# Patient Record
Sex: MALE | Race: WHITE
[De-identification: names, ages, dates, MRNs, and addresses within clinical notes are randomized per-mention and may not be internally consistent; named-entity substitution may affect disease eponyms.]

---

## 2019-01-01 ENCOUNTER — HOSPITAL ENCOUNTER (EMERGENCY)
Dept: HOSPITAL 41 - JD.ED | Age: 54
Discharge: HOME | End: 2019-01-01
Payer: COMMERCIAL

## 2019-01-01 DIAGNOSIS — Z88.8: ICD-10-CM

## 2019-01-01 DIAGNOSIS — Z88.5: ICD-10-CM

## 2019-01-01 DIAGNOSIS — J18.9: Primary | ICD-10-CM

## 2019-01-01 PROCEDURE — 87804 INFLUENZA ASSAY W/OPTIC: CPT

## 2019-01-01 PROCEDURE — 99283 EMERGENCY DEPT VISIT LOW MDM: CPT

## 2019-01-01 NOTE — ER
REASON FOR CLINIC VISIT:

Cough, fever, and myalgias.

 

HISTORY OF PRESENT ILLNESS:

This 53-year-old man began to feel generalized malaise approximately 5 days ago.

He describes it as "feeling achy."  He further went on to say that last night it

hit him "big time" with headache, coughing which has been productive of

increasing amounts of green, thick sputum.  He has had post-tussive emesis x2,

but he has also had nausea, particularly bothersome during coughing paroxysms.

He denies any diarrhea.  His biggest complaint has been of myalgias.  He has had

a fever of 100 to 101 at home.  He has been taking NyQuil.  He has no history of

smoking.  He has never had pneumonia in several years.

 

PAST MEDICAL HISTORY:

Unremarkable.

 

CURRENT MEDICATIONS:

None.

 

ALLERGIES:

To codeine and niacin.

 

REVIEW OF SYSTEMS:

Pertinent positives and negatives as in the HPI.

 

PHYSICAL EXAMINATION:

VITAL SIGNS:  He is afebrile, pulse of 83, blood pressure 122/84, respiratory

rate 16, O2 sats 93% and later 95%.

HEENT:  Head is normocephalic.  No conjunctivitis is noted.  TMs are normal.

Oropharynx is normal.

NECK:  Supple.  No adenopathy is noted.

CHEST:  He has good air exchange bilaterally.  He did have a few rhonchi at his

left base.  No rales or wheezes were noted.

CARDIAC:  Regular rate without murmur.

ABDOMEN:  Soft and nontender.  No hepatosplenomegaly.

EXTREMITIES:  Normal pulses.  No edema.

SKIN:  No rashes.

 

LABORATORY DATA:

An influenza swab was obtained for influenza A and B and this was negative.

 

FINAL DIAGNOSIS:

Community-acquired pneumonia.

 

PLAN:

Z-Magdiel, Zofran ODT 4 mg x1.  I also recommended hot steamy showers and plenty of

clear liquids.  Tylenol and ibuprofen as needed.  He should stay away from work

for the next day or two.  I also recommended Delsym over-the-counter for

symptomatic coughing relief.  He knows that should his symptoms worsen at any

time, he should return or should they persist beyond after his course of

antibiotics is completed, he should probably be rechecked as well.  All

questions were answered.  He understands and agrees.

 

DD:  01/01/2019 20:10:05

DT:  01/01/2019 22:15:51  MMODAL

Job #:  [id number]

## 2020-08-23 NOTE — EDM.PDOC
ED HPI GENERAL MEDICAL PROBLEM





- General


Chief Complaint: Chest Pain


Stated Complaint: CHEST PAIN


Time Seen by Provider: 08/23/20 11:16


Source of Information: Reports: Patient


History Limitations: Reports: No Limitations





- History of Present Illness


INITIAL COMMENTS - FREE TEXT/NARRATIVE: 


55-year-old male presents to the ED for evaluation of central chest pain that 

started about 0800 hrs. this morning.  He states he was already up for the day 

when the chest pressure discomfort came on and it lasted about an hour.  He sat 

around and it seemed to gradually dissipate.  He has a strong history of 

coronary disease having had 2 stents placed in [place] approximately 10 years 

ago and 3 stents placed in [place] about 5 years ago for total of 5 stents.  He is

on a low-cholesterol diet.  He is no longer on a blood thinner.  At present he 

is pain-free.  Denies cough sputum production shortness of breath orthopnea or 

PND.  Denies any burping or belching to relieve the discomfort.  Rarely gets 

heartburn.  No odynophagia while eating his breakfast after the pain started.





Onset: Today, Sudden


Onset Date: 08/23/20


Onset Time: 08:00


Duration: Hour(s):, Other (Pain-free at the time he was seen in the ED.)


Location: Reports: Chest (Trolled chest discomfort with no radiation through to 

his back up into his neck or arms.).  Denies: Head, Face


Quality: Reports: Pressure


Severity: Mild (Mild pressure discomfort rated at the most a 3 out of 10.)


Improves with: Reports: Other (It went away on its own.)


Worsens with: Reports: None, Other


Context: Reports: Other.  Denies: Activity, Exercise (Walking and moving did not

seem to make the pain worse.), Lifting, Sick Contact, Trauma


Associated Symptoms: Reports: Chest Pain (Spontaneous occurrence central chest 

pressure discomfort)


Treatments PTA: Reports: Other (see below) (His regular meds which she cannot 

remember all of.)


  ** Left Upper Chest


Pain Score (Numeric/FACES): 0





- Related Data


                                    Allergies











Allergy/AdvReac Type Severity Reaction Status Date / Time


 


codeine Allergy  Insomnia Verified 08/23/20 11:20


 


niacin Allergy  Hyperactivi Verified 08/23/20 11:20





   ty  











Home Meds: 


                                    Home Meds





Metoprolol Succinate 50 mg PO 08/23/20 [History]


Rosuvastatin Calcium [Crestor] 40 mg PO DAILY 08/23/20 [History]


lisinopriL [Lisinopril]  08/23/20 [History]











Past Medical History





- Past Health History


Medical/Surgical History: Denies Medical/Surgical History


Cardiovascular History: Reports: Hypertension, Stents (He had 2 stents placed 

approximately 10 years ago in [place] and 3 more stents placed in [place] 

approximately 5 years ago at [facility].).  Denies: MI


Endocrine/Metabolic History: Reports: Obesity/BMI 30+





Social & Family History





- Family History


Family Medical History: Noncontributory





- Living Situation & Occupation


Living situation: Reports: with Significant Other


Occupation: Employed





ED ROS GENERAL





- Review of Systems


Review Of Systems: See Below


Constitutional: Denies: Fever, Chills, Malaise, Weakness, Fatigue, Decreased 

Appetite, Weight Loss


HEENT: Reports: Glasses


Respiratory: Denies: Wheezing, Pleuritic Chest Pain, Cough, Sputum, Hemoptysis


Cardiovascular: Reports: Chest Pain, Blood Pressure Problem, Dyspnea on Exertion

 (On occasion.).  Denies: Claudication (See history of present illness), Edema, 

Lightheadedness, Orthopnea


Endocrine: Reports: Fatigue (Side effect of medication.)


GI/Abdominal: Reports: Other (Very rare).  Denies: Constipation, Diarrhea, 

Decreased Appetite, Nausea ( esophageal reflux.), Vomiting


: Reports: Frequency, Other (Nocturia usually x2)


Musculoskeletal: Reports: Joint Pain (Occasional pain in knees hips back and 

neck.)


Skin: Reports: No Symptoms


Neurological: Reports: No Symptoms


Psychiatric: Reports: No Symptoms


Hematologic/Lymphatic: Reports: No Symptoms


Immunologic: Reports: No Symptoms





ED EXAM, GENERAL





- Physical Exam


Exam: See Below


Exam Limited By: No Limitations


General Appearance: Alert, WD/WN, No Apparent Distress, Other (Temperature is 

36.3 pulse is 78 and sinus /91.  Respiratory to 16 with pulse oximetry of 

95% room air)


Eye Exam: Bilateral Eye: Normal Inspection, PERRL


Throat/Mouth: Normal Inspection, Normal Lips, Normal Teeth, Normal Oropharynx


Head: Atraumatic, Normocephalic


Neck: Normal Inspection, Supple, Non-Tender, Full Range of Motion.  No: Carotid 

Bruit, Lymphadenopathy (L), Lymphadenopathy (R)


Respiratory/Chest: No Respiratory Distress, Lungs Clear, Normal Breath Sounds, 

Chest Non-Tender


Cardiovascular: Normal Peripheral Pulses, Regular Rate, Rhythm, No Edema, No 

Gallop, No Murmur, No Rub


Peripheral Pulses: 2+: Carotid (L), Carotid (R), Posterior Tibial (L), Posterior

 Tibial (R), Dorsalis Pedis (L), Dorsalis Pedis (R)


GI/Abdominal: Normal Bowel Sounds, Soft, Non-Tender, No Organomegaly, No Mass, 

Pelvis Stable, Other (Abdominal girth limits ability to palpate solid organs.  

He has a minimal umbilical hernia that causes him no discomfort.)


Back Exam: Normal Inspection, Full Range of Motion.  No: CVA Tenderness (L), CVA

 Tenderness (R)


Extremities: Normal Inspection, Normal Range of Motion, Non-Tender, No Pedal 

Edema


Neurological: Alert, Oriented, CN II-XII Intact, Normal Cognition


Psychiatric: Normal Affect, Normal Mood


Skin Exam: Warm, Dry, Intact, Normal Color, No Rash





EKG INTERPRETATION


EKG Date: 08/23/20


Time: 11:16


Rhythm: NSR


Rate (Beats/Min): 78


Axis: Normal


P-Wave: Present


QRS: Other (Nonspecific intraventricular conduction delay)


ST-T: Normal


QT: Normal


EKG Interpretation Comments: 





 Normal ECG  no signs of ischemia.





Course





- Vital Signs


Last Recorded V/S: 


                                Last Vital Signs











Temp  36.3 C   08/23/20 11:16


 


Pulse  78   08/23/20 11:16


 


Resp  16   08/23/20 11:16


 


BP  158/91 H  08/23/20 11:16


 


Pulse Ox      














- Orders/Labs/Meds


Orders: 


                               Active Orders 24 hr











 Category Date Time Status


 


 EKG 12 Lead [EKG Documentation Completion] [RC] STAT Care  08/23/20 11:46 

Active


 


 Chest 1V Frontal [CR] Stat Exams  08/23/20 11:33 Taken











Labs: 


                                Laboratory Tests











  08/23/20 08/23/20 08/23/20 Range/Units





  11:25 11:25 11:25 


 


WBC  6.94    (4.23-9.07)  K/mm3


 


RBC  5.36    (4.63-6.08)  M/mm3


 


Hgb  16.4    (13.7-17.5)  gm/dl


 


Hct  49.0    (40.1-51.0)  %


 


MCV  91.4    (79.0-92.2)  fl


 


MCH  30.6    (25.7-32.2)  pg


 


MCHC  33.5    (32.2-35.5)  g/dl


 


RDW Std Deviation  43.5    (35.1-43.9)  fL


 


Plt Count  213    (163-337)  K/mm3


 


MPV  10.1    (9.4-12.3)  fl


 


Neut % (Auto)  59.7    (34.0-67.9)  %


 


Lymph % (Auto)  28.1    (21.8-53.1)  %


 


Mono % (Auto)  9.9    (5.3-12.2)  %


 


Eos % (Auto)  1.9    (0.8-7.0)  


 


Baso % (Auto)  0.3    (0.1-1.2)  %


 


Neut # (Auto)  4.14    (1.78-5.38)  K/mm3


 


Lymph # (Auto)  1.95    (1.32-3.57)  K/mm3


 


Mono # (Auto)  0.69    (0.30-0.82)  K/mm3


 


Eos # (Auto)  0.13    (0.04-0.54)  K/mm3


 


Baso # (Auto)  0.02    (0.01-0.08)  K/mm3


 


PT   10.6   (9.7-12.0)  SECONDS


 


INR   0.99   


 


APTT   25   (22-31)  SECONDS


 


Sodium    141  (136-145)  mEq/L


 


Potassium    3.8  (3.5-5.1)  mEq/L


 


Chloride    105  ()  mEq/L


 


Carbon Dioxide    25  (21-32)  mEq/L


 


Anion Gap    14.8  (5-15)  


 


BUN    15  (7-18)  mg/dL


 


Creatinine    1.1  (0.7-1.3)  mg/dL


 


Est Cr Clr Drug Dosing    74.09  mL/min


 


Estimated GFR (MDRD)    > 60  (>60)  mL/min


 


BUN/Creatinine Ratio    13.6 L  (14-18)  


 


Glucose    119 H  ()  mg/dL


 


Calcium    8.6  (8.5-10.1)  mg/dL


 


Magnesium    1.9  (1.8-2.4)  mg/dl


 


Total Bilirubin    0.4  (0.2-1.0)  mg/dL


 


AST    16  (15-37)  U/L


 


ALT    30  (16-63)  U/L


 


Alkaline Phosphatase    69  ()  U/L


 


CK-MB (CK-2)    1.6  (0-3.6)  ng/ml


 


Troponin I    < 0.017  (0.00-0.056)  ng/mL


 


C-Reactive Protein    0.2  (<1.0)  mg/dL


 


NT-Pro-B Natriuret Pep     (0-125)  pg/mL


 


Total Protein    6.8  (6.4-8.2)  g/dl


 


Albumin    3.8  (3.4-5.0)  g/dl


 


Globulin    3.0  gm/dL


 


Albumin/Globulin Ratio    1.3  (1-2)  


 


Cholesterol    148  (<200)  mg/dL


 


LDL Cholesterol Direct    76  (<100)  mg/dL


 


HDL Cholesterol    45.0  (40-59)  mg/dL














  08/23/20 Range/Units





  11:25 


 


WBC   (4.23-9.07)  K/mm3


 


RBC   (4.63-6.08)  M/mm3


 


Hgb   (13.7-17.5)  gm/dl


 


Hct   (40.1-51.0)  %


 


MCV   (79.0-92.2)  fl


 


MCH   (25.7-32.2)  pg


 


MCHC   (32.2-35.5)  g/dl


 


RDW Std Deviation   (35.1-43.9)  fL


 


Plt Count   (163-337)  K/mm3


 


MPV   (9.4-12.3)  fl


 


Neut % (Auto)   (34.0-67.9)  %


 


Lymph % (Auto)   (21.8-53.1)  %


 


Mono % (Auto)   (5.3-12.2)  %


 


Eos % (Auto)   (0.8-7.0)  


 


Baso % (Auto)   (0.1-1.2)  %


 


Neut # (Auto)   (1.78-5.38)  K/mm3


 


Lymph # (Auto)   (1.32-3.57)  K/mm3


 


Mono # (Auto)   (0.30-0.82)  K/mm3


 


Eos # (Auto)   (0.04-0.54)  K/mm3


 


Baso # (Auto)   (0.01-0.08)  K/mm3


 


PT   (9.7-12.0)  SECONDS


 


INR   


 


APTT   (22-31)  SECONDS


 


Sodium   (136-145)  mEq/L


 


Potassium   (3.5-5.1)  mEq/L


 


Chloride   ()  mEq/L


 


Carbon Dioxide   (21-32)  mEq/L


 


Anion Gap   (5-15)  


 


BUN   (7-18)  mg/dL


 


Creatinine   (0.7-1.3)  mg/dL


 


Est Cr Clr Drug Dosing   mL/min


 


Estimated GFR (MDRD)   (>60)  mL/min


 


BUN/Creatinine Ratio   (14-18)  


 


Glucose   ()  mg/dL


 


Calcium   (8.5-10.1)  mg/dL


 


Magnesium   (1.8-2.4)  mg/dl


 


Total Bilirubin   (0.2-1.0)  mg/dL


 


AST   (15-37)  U/L


 


ALT   (16-63)  U/L


 


Alkaline Phosphatase   ()  U/L


 


CK-MB (CK-2)   (0-3.6)  ng/ml


 


Troponin I   (0.00-0.056)  ng/mL


 


C-Reactive Protein   (<1.0)  mg/dL


 


NT-Pro-B Natriuret Pep  32  (0-125)  pg/mL


 


Total Protein   (6.4-8.2)  g/dl


 


Albumin   (3.4-5.0)  g/dl


 


Globulin   gm/dL


 


Albumin/Globulin Ratio   (1-2)  


 


Cholesterol   (<200)  mg/dL


 


LDL Cholesterol Direct   (<100)  mg/dL


 


HDL Cholesterol   (40-59)  mg/dL














- Radiology Interpretation


Free Text/Narrative:: 


55-year-old male presents to the ED for evaluation of central chest pressure 

discomfort that he developed about 0800 hrs. this morning.  He was already up 

for the day when the pressure discomfort came on and he was not exerting himself

 at that time.  He states the pressure lasted about an hour and then gradually 

dissipated and upon arrival in the ED he is pain-free.  He mentioned this to his

 girlfriend and to his children on the phone this morning and they have 

essentially forced him to come to the ED for further evaluation.  Patient has a 

strong history of coronary disease having had 2 stents placed in [place] about 

10 years ago and 3 more stents placed in [place] about 5 years ago.  No recent 

changes to his medications and he reports he has been compliant with meds.  He 

is fairly obese but denies any significant problems with reflux.  I suspect he 

may have a hiatal hernia.  Upon arrival in the ED he is pain-free.  Lungs are 

clear to all station percussion he is in sinus rhythm and his ECG shows no signs

 of ischemia.  Plan will be routine lab work to include cardiac markers since it

 has been about 3-1/2 almost 4 hours since initial onset of chest discomfort.  

Of note the patient did not take any nitroglycerin for pain this morning.








- Re-Assessments/Exams


Free Text/Narrative Re-Assessment/Exam: 





08/23/20 11:55 chest x-ray done portable technique reveals moderate cardiomegaly

 and diffuse vascular congestion pattern.  No pleural effusions no pneumothorax.





08/23/20 12:21 Labs reveal a normal white count at 6.94.  The auto differential 

shows 60% neutrophils.  Hemoglobin is 16.4 with hematocrit of 49.0 suggesting 

mild hemoconcentration.  Platelet count is normal at 213,000.  PT is 10.6 with 

an INR of 0.99.  PTT is 25.  Sodium is 141 with a potassium of 3.8.  Chloride 

105 with a bicarb of 25.  Anion gap is 14.8.  BUN is 15 with a creatinine 1.1.  

GFR is greater than 60.  Glucose is 119 calcium is 8.6 magnesium is 1.9 liver 

function normal.  CK-MB fraction is 1.6 and troponin I is less than 0.017.  C-

reactive protein is 0.2.  BNP is 32.  Total protein is 6.8 with an albumin 

fraction of 3.8.  Total cholesterol is 148 with an LDL cholesterol of 76 and an 

HDL cholesterol of 45





08/23/20 12:24 I discussed the findings with the patient.  He remains 

asymptomatic.  My impression is likely esophageal spasm from reflux during the 

night for which she is asymptomatic.  He finds certain foods do bother him.  I 

suggest that if symptoms persist then he might want to take Pepcid 20 mg every 

night at bedtime.  However today he is released with no restrictions and no 

change in medications.








Departure





- Departure


Time of Disposition: 12:25


Disposition: Home, Self-Care 01


Reason for Transfer *Q: Other


Condition: Fair


Clinical Impression: 


 Non-cardiac chest pain, Gastroesophageal reflux disease





Instructions:  Food Choices for Gastroesophageal Reflux Disease, Adult, 

Gastroesophageal Reflux Disease, Adult, Easy-to-Read


Referrals: 


Barbara Avalos PA-C [Primary Care Provider] - 


Forms:  ED Department Discharge


Additional Instructions: 


Evaluation in the emergency room today in regards to central chest pressure 

discomfort that came on about 0800 hrs. this morning and persisted for about an 

hour before it let up and stayed away.  With your strong history of coronary 

disease having 5 stents placed over the last 10 years it was felt prudent to 

come to the ED for further evaluation which I agree with.  ECG done did not show

any signs of acute ischemia or heart attack.  Chest x-ray shows an enlarged 

heart but otherwise no other abnormalities lab test revealed no evidence of 

heart related illness with cardiac markers being less than 0.  In fact all of 

your labs were normal.  Cholesterol was also done as it was being requested by 

Barbara Cabrera your physician assistant.  Even though this was a nonfasting lab 

test your cholesterol is excellent and no further change in medicine is 

indicated at this time.  At this time I suspect your transient chest pain was 

due to spasm of the food pipe likely due to mild reflux that occurs during the 

night during sleep.  This is a common occurrence and since the food pipe sits 

right behind the heart it mimics a heart attack to  a Butch.  Similar symptoms 

persist I would suggest purchasing Pepcid 20 mg tablets over-the-counter and 

take 1 every night at bedtime to reduce acid secretion during the night and 

potential reflux.  No changes in medications to be made at this time





Sepsis Event Note (ED)





- Focused Exam


Vital Signs: 


                                   Vital Signs











  Temp Pulse Resp BP


 


 08/23/20 11:16  36.3 C  78  16  158/91 H














- My Orders


Last 24 Hours: 


My Active Orders





08/23/20 11:33


Chest 1V Frontal [CR] Stat 





08/23/20 11:46


EKG 12 Lead [EKG Documentation Completion] [RC] STAT 














- Assessment/Plan


Last 24 Hours: 


My Active Orders





08/23/20 11:33


Chest 1V Frontal [CR] Stat 





08/23/20 11:46


EKG 12 Lead [EKG Documentation Completion] [RC] STAT